# Patient Record
Sex: FEMALE | NOT HISPANIC OR LATINO | URBAN - METROPOLITAN AREA
[De-identification: names, ages, dates, MRNs, and addresses within clinical notes are randomized per-mention and may not be internally consistent; named-entity substitution may affect disease eponyms.]

---

## 2018-08-13 ENCOUNTER — EMERGENCY (EMERGENCY)
Facility: HOSPITAL | Age: 23
LOS: 1 days | Discharge: HOME | End: 2018-08-13
Attending: EMERGENCY MEDICINE
Payer: COMMERCIAL

## 2018-08-13 VITALS
HEART RATE: 83 BPM | DIASTOLIC BLOOD PRESSURE: 76 MMHG | RESPIRATION RATE: 18 BRPM | OXYGEN SATURATION: 98 % | TEMPERATURE: 98 F | SYSTOLIC BLOOD PRESSURE: 118 MMHG

## 2018-08-13 LAB
ALBUMIN SERPL ELPH-MCNC: 3.8 G/DL — SIGNIFICANT CHANGE UP (ref 3.5–5)
ALP SERPL-CCNC: 58 U/L — SIGNIFICANT CHANGE UP (ref 40–120)
ALT FLD-CCNC: 27 U/L DA — SIGNIFICANT CHANGE UP (ref 10–60)
ANION GAP SERPL CALC-SCNC: 6 MMOL/L — SIGNIFICANT CHANGE UP (ref 5–17)
APPEARANCE UR: CLEAR — SIGNIFICANT CHANGE UP
AST SERPL-CCNC: 23 U/L — SIGNIFICANT CHANGE UP (ref 10–40)
BASOPHILS # BLD AUTO: 0.1 K/UL — SIGNIFICANT CHANGE UP (ref 0–0.2)
BASOPHILS NFR BLD AUTO: 1 % — SIGNIFICANT CHANGE UP (ref 0–2)
BILIRUB SERPL-MCNC: 0.5 MG/DL — SIGNIFICANT CHANGE UP (ref 0.2–1.2)
BILIRUB UR-MCNC: NEGATIVE — SIGNIFICANT CHANGE UP
BUN SERPL-MCNC: 11 MG/DL — SIGNIFICANT CHANGE UP (ref 7–18)
CALCIUM SERPL-MCNC: 9.3 MG/DL — SIGNIFICANT CHANGE UP (ref 8.4–10.5)
CHLORIDE SERPL-SCNC: 108 MMOL/L — SIGNIFICANT CHANGE UP (ref 96–108)
CO2 SERPL-SCNC: 26 MMOL/L — SIGNIFICANT CHANGE UP (ref 22–31)
COLOR SPEC: YELLOW — SIGNIFICANT CHANGE UP
CREAT SERPL-MCNC: 0.9 MG/DL — SIGNIFICANT CHANGE UP (ref 0.5–1.3)
DIFF PNL FLD: ABNORMAL
EOSINOPHIL # BLD AUTO: 0.1 K/UL — SIGNIFICANT CHANGE UP (ref 0–0.5)
EOSINOPHIL NFR BLD AUTO: 2.1 % — SIGNIFICANT CHANGE UP (ref 0–6)
GLUCOSE SERPL-MCNC: 80 MG/DL — SIGNIFICANT CHANGE UP (ref 70–99)
GLUCOSE UR QL: NEGATIVE — SIGNIFICANT CHANGE UP
HCG UR QL: NEGATIVE — SIGNIFICANT CHANGE UP
HCT VFR BLD CALC: 41.6 % — SIGNIFICANT CHANGE UP (ref 34.5–45)
HGB BLD-MCNC: 13.2 G/DL — SIGNIFICANT CHANGE UP (ref 11.5–15.5)
KETONES UR-MCNC: NEGATIVE — SIGNIFICANT CHANGE UP
LEUKOCYTE ESTERASE UR-ACNC: NEGATIVE — SIGNIFICANT CHANGE UP
LYMPHOCYTES # BLD AUTO: 2.1 K/UL — SIGNIFICANT CHANGE UP (ref 1–3.3)
LYMPHOCYTES # BLD AUTO: 36.5 % — SIGNIFICANT CHANGE UP (ref 13–44)
MCHC RBC-ENTMCNC: 30.1 PG — SIGNIFICANT CHANGE UP (ref 27–34)
MCHC RBC-ENTMCNC: 31.7 GM/DL — LOW (ref 32–36)
MCV RBC AUTO: 95 FL — SIGNIFICANT CHANGE UP (ref 80–100)
MONOCYTES # BLD AUTO: 0.4 K/UL — SIGNIFICANT CHANGE UP (ref 0–0.9)
MONOCYTES NFR BLD AUTO: 7.1 % — SIGNIFICANT CHANGE UP (ref 2–14)
NEUTROPHILS # BLD AUTO: 3 K/UL — SIGNIFICANT CHANGE UP (ref 1.8–7.4)
NEUTROPHILS NFR BLD AUTO: 53.3 % — SIGNIFICANT CHANGE UP (ref 43–77)
NITRITE UR-MCNC: NEGATIVE — SIGNIFICANT CHANGE UP
PH UR: 5 — SIGNIFICANT CHANGE UP (ref 5–8)
PLATELET # BLD AUTO: 307 K/UL — SIGNIFICANT CHANGE UP (ref 150–400)
POTASSIUM SERPL-MCNC: 4.9 MMOL/L — SIGNIFICANT CHANGE UP (ref 3.5–5.3)
POTASSIUM SERPL-SCNC: 4.9 MMOL/L — SIGNIFICANT CHANGE UP (ref 3.5–5.3)
PROT SERPL-MCNC: 8.3 G/DL — SIGNIFICANT CHANGE UP (ref 6–8.3)
PROT UR-MCNC: 30 MG/DL
RBC # BLD: 4.38 M/UL — SIGNIFICANT CHANGE UP (ref 3.8–5.2)
RBC # FLD: 11.3 % — SIGNIFICANT CHANGE UP (ref 10.3–14.5)
SODIUM SERPL-SCNC: 140 MMOL/L — SIGNIFICANT CHANGE UP (ref 135–145)
SP GR SPEC: 1.02 — SIGNIFICANT CHANGE UP (ref 1.01–1.02)
UROBILINOGEN FLD QL: NEGATIVE — SIGNIFICANT CHANGE UP
WBC # BLD: 5.6 K/UL — SIGNIFICANT CHANGE UP (ref 3.8–10.5)
WBC # FLD AUTO: 5.6 K/UL — SIGNIFICANT CHANGE UP (ref 3.8–10.5)

## 2018-08-13 PROCEDURE — 76856 US EXAM PELVIC COMPLETE: CPT

## 2018-08-13 PROCEDURE — 76856 US EXAM PELVIC COMPLETE: CPT | Mod: 26

## 2018-08-13 PROCEDURE — 81001 URINALYSIS AUTO W/SCOPE: CPT

## 2018-08-13 PROCEDURE — 80053 COMPREHEN METABOLIC PANEL: CPT

## 2018-08-13 PROCEDURE — 76830 TRANSVAGINAL US NON-OB: CPT

## 2018-08-13 PROCEDURE — 99284 EMERGENCY DEPT VISIT MOD MDM: CPT

## 2018-08-13 PROCEDURE — 99284 EMERGENCY DEPT VISIT MOD MDM: CPT | Mod: 25

## 2018-08-13 PROCEDURE — 81025 URINE PREGNANCY TEST: CPT

## 2018-08-13 PROCEDURE — 85027 COMPLETE CBC AUTOMATED: CPT

## 2018-08-13 PROCEDURE — 87086 URINE CULTURE/COLONY COUNT: CPT

## 2018-08-13 PROCEDURE — 76830 TRANSVAGINAL US NON-OB: CPT | Mod: 26

## 2018-08-13 RX ORDER — SODIUM CHLORIDE 9 MG/ML
1000 INJECTION INTRAMUSCULAR; INTRAVENOUS; SUBCUTANEOUS ONCE
Qty: 0 | Refills: 0 | Status: COMPLETED | OUTPATIENT
Start: 2018-08-13 | End: 2018-08-13

## 2018-08-13 RX ADMIN — SODIUM CHLORIDE 1000 MILLILITER(S): 9 INJECTION INTRAMUSCULAR; INTRAVENOUS; SUBCUTANEOUS at 20:18

## 2018-08-13 NOTE — ED PROVIDER NOTE - OBJECTIVE STATEMENT
22 y/o F with no significant PMHx or no significant PSHx presents to the ED c/o severe vaginal bleeding x 2 weeks and worsening abd pain mild dizziness and nausea. Pt denies painful urination, vomiting, fever, chills or any other complaint. Pt stated that she has changed her birth control 3 months ago, has regular periods. SHx: no tobacco, drug or ETOH use. NKDA. 22 y/o F with no significant PMHx or no significant PSHx presents to the ED c/o severe vaginal bleeding x 2 weeks and worsening abd pain mild dizziness and nausea. Pt denies painful urination, vomiting, fever, chills or any other complaint. Pt stated that she has changed her birth control (Microgestin) 3 months ago, has regular periods. SHx: no tobacco, drug or ETOH use. NKDA.

## 2018-08-13 NOTE — ED PROVIDER NOTE - ATTENDING CONTRIBUTION TO CARE
I performed the initial face to face bedside interview with this patient regarding history of present illness, review of symptoms and past medical, social and family history.  I completed an independent physical examination.  I was the initial provider who evaluated this patient.  The history, review of symptoms and examination was documented by the scribe in my presence and I attest to the accuracy of the documentation.  I have signed out the follow up of any pending tests (i.e. labs, radiological studies) to the PA/NP.  I have discussed the patient’s plan of care and disposition with the PA/NP.

## 2018-08-13 NOTE — ED PROVIDER NOTE - PROGRESS NOTE DETAILS
NP NOTE: Pt seen by intake physician and HPI/ROS/PE/MDM reviewed. Pt seen and evaluated. Discussed plan and any resulted studies at this time. Will continue to monitor and re-evaluate.  Re-Evaluation: pt feeling improved. will rx naproxyn, provide gyn fu, and dc.

## 2018-08-13 NOTE — ED PROVIDER NOTE - MEDICAL DECISION MAKING DETAILS
13-Jun-2017 00:00
24 y/o F presenting to ED with Menorrhagia. Will do blood work, US. Pregnancy test: negative. Urine test: micro traces if blood.

## 2018-08-13 NOTE — ED ADULT NURSE REASSESSMENT NOTE - NS ED NURSE REASSESS COMMENT FT1
2113 - pt  feeling much better after the treat ment  IV dc'd with NS 1 liter completed . feels much better.

## 2018-08-13 NOTE — ED ADULT NURSE NOTE - NSIMPLEMENTINTERV_GEN_ALL_ED
Implemented All Universal Safety Interventions:  [place] to call system. Call bell, personal items and telephone within reach. Instruct patient to call for assistance. Room bathroom lighting operational. Non-slip footwear when patient is off stretcher. Physically safe environment: no spills, clutter or unnecessary equipment. Stretcher in lowest position, wheels locked, appropriate side rails in place.

## 2018-08-13 NOTE — ED ADULT NURSE NOTE - OBJECTIVE STATEMENT
pt came in with c/o vaginal bleeding x 3 weeks pt  denies any pain LMP last 7/22/2018 . pt  consumes 5 pads a day  as per pt. pt denies any dizziness/ nausea and vomiting

## 2018-08-14 LAB
CULTURE RESULTS: SIGNIFICANT CHANGE UP
SPECIMEN SOURCE: SIGNIFICANT CHANGE UP

## 2018-09-18 NOTE — ED ADULT TRIAGE NOTE - NS ED NURSE BANDS TYPE
Name band;
How Severe Is Your Psoriasis?: moderate
Is This A New Presentation, Or A Follow-Up?: Follow Up Psoriasis

## 2022-06-24 ENCOUNTER — EMERGENCY (EMERGENCY)
Facility: HOSPITAL | Age: 27
LOS: 1 days | Discharge: HOME | End: 2022-06-24
Attending: EMERGENCY MEDICINE
Payer: COMMERCIAL

## 2022-06-24 VITALS
HEIGHT: 64 IN | HEART RATE: 76 BPM | WEIGHT: 162.92 LBS | OXYGEN SATURATION: 99 % | RESPIRATION RATE: 18 BRPM | TEMPERATURE: 98 F | SYSTOLIC BLOOD PRESSURE: 119 MMHG | DIASTOLIC BLOOD PRESSURE: 79 MMHG

## 2022-06-24 PROCEDURE — 99282 EMERGENCY DEPT VISIT SF MDM: CPT

## 2022-06-24 RX ORDER — NEOMYCIN/POLYMYXIN B/HYDROCORT
2 SUSPENSION, DROPS(FINAL DOSAGE FORM)(ML) OTIC (EAR)
Qty: 1 | Refills: 0
Start: 2022-06-24 | End: 2022-06-30

## 2022-06-24 NOTE — ED PROVIDER NOTE - NSFOLLOWUPINSTRUCTIONS_ED_ALL_ED_FT
Otitis Externa    Ear anatomy showing the outer ear canal and the eardrum. The outer ear canal is red due to swimmer's ear.   Otitis externa is an infection of the outer ear canal. The outer ear canal is the area between the outside of the ear and the eardrum. Otitis externa is sometimes called swimmer's ear.      What are the causes?    Common causes of this condition include:  •Swimming in dirty water.      •Moisture in the ear.      •An injury to the inside of the ear.      •An object stuck in the ear.      •A cut or scrape on the outside of the ear or in the ear canal.        What increases the risk?    You are more likely to develop this condition if you go swimming often.      What are the signs or symptoms?    The first symptom of this condition is often itching in the ear. Later symptoms of the condition include:  •Swelling of the ear.      •Redness in the ear.      •Ear pain. The pain may get worse when you pull on your ear.      •Pus coming from the ear.        How is this diagnosed?    This condition may be diagnosed by examining the ear and testing fluid from the ear for bacteria and funguses.      How is this treated?    This condition may be treated with:  •Antibiotic ear drops. These are often given for 10–14 days.      •Medicines to reduce itching and swelling.        Follow these instructions at home:    •If you were prescribed antibiotic ear drops, use them as told by your health care provider. Do not stop using the antibiotic even if you start to feel better.      •Take over-the-counter and prescription medicines only as told by your health care provider.      •Avoid getting water in your ears as told by your health care provider. This may include avoiding swimming or water sports for a few days.      •Keep all follow-up visits. This is important.        How is this prevented?    •Keep your ears dry. Use the corner of a towel to dry your ears after you swim or bathe.      •Avoid scratching or putting things in your ear. Doing these things can damage the ear canal or remove the protective wax that lines it, which makes it easier for bacteria and funguses to grow.      •Avoid swimming in lakes, polluted water, or swimming pools that may not have enough chlorine.        Contact a health care provider if:    •You have a fever.      •Your ear is still red, swollen, painful, or draining pus after 3 days.      •Your redness, swelling, or pain gets worse.      •You have a severe headache.        Get help right away if:    •You have redness, swelling, and pain or tenderness in the area behind your ear.        Summary    •Otitis externa is an infection of the outer ear canal.      •Common causes include swimming in dirty water, moisture in the ear, or a cut or scrape in the ear.      •Symptoms include pain, redness, and swelling of the ear canal.      •If you were prescribed antibiotic ear drops, use them as told by your health care provider. Do not stop using the antibiotic even if you start to feel better.      This information is not intended to replace advice given to you by your health care provider. Make sure you discuss any questions you have with your health care provider.      Document Revised: 03/02/2022 Document Reviewed: 03/02/2022    Elsevier Patient Education © 2022 [employer]

## 2022-06-24 NOTE — ED PROVIDER NOTE - PATIENT PORTAL LINK FT
You can access the FollowMyHealth Patient Portal offered by [facility] by registering at the following website: [email or web address]. By joining [employer]’s FollowMyHealth portal, you will also be able to view your health information using other applications (apps) compatible with our system.

## 2022-06-24 NOTE — ED ADULT TRIAGE NOTE - ARRIVAL FROM
Additional Notes: Patient consent was obtained to proceed with the visit and recommended plan of care after discussion of all risks and benefits, including the risks of COVID-19 exposure. Detail Level: Simple Home

## 2022-06-24 NOTE — ED PROVIDER NOTE - OBJECTIVE STATEMENT
28 y/o female presents to ED with left ear pain since this morning.  pt reporting some ear discharge associated.  pt is unsure is something might be in her ear.  Denies fever, no cough, no sore throat.

## 2023-02-22 ENCOUNTER — EMERGENCY (EMERGENCY)
Facility: HOSPITAL | Age: 28
LOS: 1 days | Discharge: HOME | End: 2023-02-22
Admitting: EMERGENCY MEDICINE
Payer: COMMERCIAL

## 2023-02-22 VITALS
DIASTOLIC BLOOD PRESSURE: 89 MMHG | HEIGHT: 67 IN | RESPIRATION RATE: 20 BRPM | HEART RATE: 94 BPM | WEIGHT: 179.9 LBS | SYSTOLIC BLOOD PRESSURE: 129 MMHG | TEMPERATURE: 97 F | OXYGEN SATURATION: 98 %

## 2023-02-22 PROCEDURE — 99053 MED SERV 10PM-8AM 24 HR FAC: CPT

## 2023-02-22 PROCEDURE — 99284 EMERGENCY DEPT VISIT MOD MDM: CPT

## 2023-02-22 RX ORDER — LORATADINE 10 MG/1
10 TABLET ORAL ONCE
Refills: 0 | Status: COMPLETED | OUTPATIENT
Start: 2023-02-22 | End: 2023-02-22

## 2023-02-22 RX ADMIN — LORATADINE 10 MILLIGRAM(S): 10 TABLET ORAL at 23:56

## 2023-02-22 RX ADMIN — Medication 40 MILLIGRAM(S): at 23:57

## 2023-02-22 NOTE — ED ADULT TRIAGE NOTE - CHIEF COMPLAINT QUOTE
Pt wheeled into ER by security accompanied by friend. Pt admits to drinking tonight with friends and is unable to walk. Pt had x1 episode of vomiting PTA. Mother on telephone provided PMH (none) and allergy (strawberries). No obvious injuries observed.

## 2023-02-22 NOTE — ED PROVIDER NOTE - PHYSICAL EXAMINATION
Gen - WDWN F, +AOB, no acute distress  Skin - warm, dry, intact  HEENT - AT/NC, PERRL, mild conjunctival injection, pupils 3mm b/l, TM intact with no hemotympanium b/l, no facial contusion or periorbital ecchymosis, o/p clear with no erythema or edema, uvula midline, airway patent, neck supple with no step off or midline tenderness, FROM   CV - S1S2, R/R/R  Resp - respiration non-labored, CTAB, symmetric bs b/l, no r/r/w  GI - NABS, soft, ND, NT, no rebound or guarding, no CVAT b/l  MS - w/w/p, no c/c/e, calves supple and NT  Neuro - Alert and awake and oriented, slightly slurred speech, ambulatory with steady gait, no focal deficits

## 2023-02-22 NOTE — ED PROVIDER NOTE - CLINICAL SUMMARY MEDICAL DECISION MAKING FREE TEXT BOX
pt here for subjective sob/scratchy throat after accidental ingestion of alcoholic drink that contains strawberry, +known allergic reaction to strawberry without anaphylaxi, +public intox, euglycemic, give dose of antihistamine and steroids and monitored in the ED with improved mental status and sx, no airway involvement, tolerating po without N/V, AFVSS, currently ambulatory with steady gait, clear speech, without additional medical complaints noted.  Repeat exam and neuro/cranial nerve exams wnl.  No evidence of head/neck trauma. Pt feels much better and safe for discharge. Counseling provided. Medically stable for d/c. pt here for subjective sob/scratchy throat after accidental ingestion of alcoholic drink that contains strawberry, +known allergic reaction to strawberry without anaphylaxis, +public intox, euglycemic, give dose of antihistamine and steroids and monitored in the ED with improved mental status and sx, no airway involvement, tolerating po without N/V, AFVSS, currently ambulatory with steady gait, clear speech, without additional medical complaints noted.  Repeat exam and neuro/cranial nerve exams wnl.  No evidence of head/neck trauma. No relapse of allergic sx, friend at bedside, will accompany patient home. Pt feels much better and safe for discharge. Counseling provided. Medically stable for d/c.

## 2023-02-22 NOTE — ED PROVIDER NOTE - PATIENT PORTAL LINK FT
You can access the FollowMyHealth Patient Portal offered by [facility] by registering at the following website: [email or web address]. By joining [employer]’s FollowMyHealth portal, you will also be able to view your health information using other applications (apps) compatible with our system.

## 2023-02-22 NOTE — ED PROVIDER NOTE - OBJECTIVE STATEMENT
27 yo F with no known PMHx, BIBA for AMS and subjective sob after accidental ingestion of a sip of cocktail with strawberry. Pt has a known allergy to strawberry and accidentally had a sip of cocktail from her friends and noted feeling throat scratching sensation and subjective sob. Denies fever, chills, CP, palpitations, wheezing, stridors, change in phonating, tongue/lip swelling, focal weakness, HA, dizziness, N/V/D/C, oral/genital lesions, dysphagia, cough, paresthesia, numbness, tingling, malaise, and diaphoresis.  No other new products/meds/food reported.

## 2023-02-22 NOTE — ED PROVIDER NOTE - CARE PROVIDER_API CALL
your PMD,   Phone: (   )    -  Fax: (   )    -  Follow Up Time:     Liliana Jiménez (MD)  Allergy and Immunology  [address] [place] [address]  Phone: [phone]  Fax: [phone]  Follow Up Time:

## 2023-02-23 VITALS — OXYGEN SATURATION: 98 % | HEART RATE: 78 BPM | RESPIRATION RATE: 16 BRPM

## 2023-02-23 NOTE — ED ADULT NURSE NOTE - OBJECTIVE STATEMENT
Patient presents to the ED c/o AMS s/p alcohol intoxication and possible allergic reaction to a sip of her friend's drink containing strawberries. Patient is allergic to strawberries as per mom. Patient is currently resting in NAD on the pulse oximetry machine. Will continue to monitor in the ED.

## 2023-02-23 NOTE — ED ADULT NURSE REASSESSMENT NOTE - NS ED NURSE REASSESS COMMENT FT1
Transfer of care from ARMANDO Khoury. Pt sleeping in bed, in NAD, fall precautions maintained, will continue to monitor closely.

## 2023-02-24 DIAGNOSIS — R07.0 PAIN IN THROAT: ICD-10-CM
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
## 2023-02-24 DIAGNOSIS — F10.129 ALCOHOL ABUSE WITH INTOXICATION, UNSPECIFIED: ICD-10-CM

## 2023-02-24 DIAGNOSIS — R41.82 ALTERED MENTAL STATUS, UNSPECIFIED: ICD-10-CM

## 2023-02-24 DIAGNOSIS — T78.1XXA OTHER ADVERSE FOOD REACTIONS, NOT ELSEWHERE CLASSIFIED, INITIAL ENCOUNTER: ICD-10-CM

## 2023-02-24 DIAGNOSIS — R06.02 SHORTNESS OF BREATH: ICD-10-CM

## 2023-02-24 DIAGNOSIS — Z91.018 ALLERGY TO OTHER FOODS: ICD-10-CM
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
## 2024-04-17 NOTE — ED PROVIDER NOTE - CPE EDP EYES NORM
Based on CDI recommendations the Evaluating physical therapist has redefined and rewritten our focus of care as follows:    Physical Therapy SADIEMARY ALICE Hunter, 60 Day Summary:    FOCUS OF CARE FOR THE NEXT 60 DAYS: alcoholic cirrhosis, chronic alcohol use disorder, peripheral neuropathy, DMT2, anxiety and depression, insomnia, diarrhea based on documentation from Telehealth visit with Unique Brandt DO on 4/11/2024. Pt also has HTN, abnormal gait and recent history of fall with injury.    Physical Therapy treatment is needed to address Ms. Mireille Cordoba Debility and High fall risk related to alcoholic cirrhosis, chronic alcohol use disorder, peripheral neuropathy, DMT2, anxiety and depression, insomnia, diarrhea based on documentation from Telehealth visit with Unique Brandt DO on 4/11/2024. Pt also has HTN, abnormal gait and recent history of fall with injury. Pt recently moved to a new rental property which has laundry facilies in Basement and stairs at all entryways. Pt lives alons and has a paid caregiver who assists her with bathing and other ADLs. PT will be working on helping pt improve her safe ability to negotiate stairs. SN and OT have discharged patient.     Past Medical Hx:  Essential hypertension  Generalized anxiety disorder   Hyperlipidemia   Glaucoma    Alcoholic cirrhosis of liver without ascites   Severe to moderate malnutrition   GERD without esophagitis   Sleep disturbance   Idiopathic peripheral neuropathy   Severe anxiety with panic   Alcohol use disorder   Cigarette nicotine dependence in remission   Chronic obstructive pulmonary disease   Closed fracture of left hand     Current level of functional ability: Min assist to CGA for ambulation on stairs.  Homebound status and living arrangements: Lives alone. Paid CG assists 2 days per wk. Pt moved to this house several months ago and states she is hoping to move to a 1 level home ASA.    Skin integrity/wound status: CDI  Estimated date  when home care services will end 5/18/24  SDOH changes/barriers (i.e. Caregiver availability, social isolation, environment, income, transportation access, food insecurity etc.): none at this time  Need for MSW referral? N    Weight Bearing Status and Activity Precautions: FWB, fall precautions, ongoing ETOH use/abuse, DM and neuropathy effecting coordination and balance.  Code Status: CPR  Fall Risk: Moderate    PT Plan of care for next 30 days:  Planned PT Interventions: therapeutic activities, gait training, therapeutic exercise, neuromuscular re-education, pt/CG education (HEP, symptom management, fall risk reduction), assist with DME as needed for safe mobility.  PT Frequency and Duration: 1w4    Plan of Care Reviewed with: HH care team and Unique Brandt DO normal...

## 2024-07-10 PROBLEM — Z00.00 ENCOUNTER FOR PREVENTIVE HEALTH EXAMINATION: Status: ACTIVE | Noted: 2024-07-10

## 2024-07-24 ENCOUNTER — APPOINTMENT (OUTPATIENT)
Dept: NEUROLOGY | Facility: CLINIC | Age: 29
End: 2024-07-24
Payer: MEDICAID

## 2024-07-24 VITALS — SYSTOLIC BLOOD PRESSURE: 125 MMHG | HEART RATE: 79 BPM | DIASTOLIC BLOOD PRESSURE: 84 MMHG

## 2024-07-24 DIAGNOSIS — F32.A ANXIETY DISORDER, UNSPECIFIED: ICD-10-CM

## 2024-07-24 DIAGNOSIS — Z78.9 OTHER SPECIFIED HEALTH STATUS: ICD-10-CM

## 2024-07-24 DIAGNOSIS — E78.5 HYPERLIPIDEMIA, UNSPECIFIED: ICD-10-CM

## 2024-07-24 DIAGNOSIS — Z80.8 FAMILY HISTORY OF MALIGNANT NEOPLASM OF OTHER ORGANS OR SYSTEMS: ICD-10-CM

## 2024-07-24 DIAGNOSIS — Z83.3 FAMILY HISTORY OF DIABETES MELLITUS: ICD-10-CM

## 2024-07-24 DIAGNOSIS — G43.909 MIGRAINE, UNSPECIFIED, NOT INTRACTABLE, W/OUT STATUS MIGRAINOSUS: ICD-10-CM

## 2024-07-24 DIAGNOSIS — F41.9 ANXIETY DISORDER, UNSPECIFIED: ICD-10-CM

## 2024-07-24 PROCEDURE — 99203 OFFICE O/P NEW LOW 30 MIN: CPT

## 2024-07-24 NOTE — PHYSICAL EXAM
[General Appearance - Alert] : alert [General Appearance - Well Nourished] : well nourished [General Appearance - Well-Appearing] : healthy appearing [Oriented To Time, Place, And Person] : oriented to person, place, and time [Affect] : the affect was normal [Memory Recent] : recent memory was not impaired [Person] : oriented to person [Place] : oriented to place [Time] : oriented to time [Cranial Nerves Optic (II)] : visual acuity intact bilaterally,  visual fields full to confrontation, pupils equal round and reactive to light [Cranial Nerves Oculomotor (III)] : extraocular motion intact [Cranial Nerves Facial (VII)] : face symmetrical [Cranial Nerves Accessory (XI - Cranial And Spinal)] : head turning and shoulder shrug symmetric [Motor Tone] : muscle tone was normal in all four extremities [Motor Strength] : muscle strength was normal in all four extremities [Sensation Tactile Decrease] : light touch was intact [Balance] : balance was intact [2+] : Patella left 2+ [Sclera] : the sclera and conjunctiva were normal [Outer Ear] : the ears and nose were normal in appearance [Neck Appearance] : the appearance of the neck was normal [] : no respiratory distress [Abnormal Walk] : normal gait [Skin Color & Pigmentation] : normal skin color and pigmentation

## 2024-07-24 NOTE — ASSESSMENT
[FreeTextEntry1] : This is a case of a 29 yr right handed female with PMH of hld, migraines, anxiety and depression presents today with worsening migraines.  Will get MRI for familial hx reported by patient and worsening Ha Trial Naproxen   Plan:   {   \} Brain MRI-   {   \} naproxen 500 mg   {   \} start magnesium 400 and b2 400 mg   {   \} f/up 2-3 months   Headache education provided: [] Stay well hydrated [] Limit excessive caffeine and alcohol intake [] Maintain good sleep hygiene [] Try to avoid triggers [] Practice good eating habits [] Avoid excessive use of over the counter pain medications, as they can cause medication overuse headaches  [] Keep a headache diary [] Relaxation techniques, biofeedback, massage therapy, acupunctures, and heating pads may be effective   The above plan was discussed with Rubia Acevedo in great detail. Rubia Acevedo  verbalized understanding and agrees with plan as detailed above. Patient was provided education and counselling on current diagnosis/symptoms. She was advised to call our clinic at [phone] for any new or worsening symptoms, or with any questions or concerns. In case of acute onset of neurological symptoms or worsening presentation, patient was advised to present to nearest emergency room for further evaluation. Rubia Acevedo  expressed understanding and all her questions/concerns were addressed.

## 2024-07-24 NOTE — HISTORY OF PRESENT ILLNESS
[FreeTextEntry1] : This is a case of a 29 yr right handed female with PMH of hld, migraines, anxiety and depression presents today with headaches.  Pt has had a history of headaches since childhood and teens.  Pt reports headaches have been on and off through the years.  Headaches can be nonexistent for 5 months and come out of now where. In the end of June headaches have been more frequent.  headaches are now every 2 days.  Described as pressure.  denies n/v.  Pt does photophobia denies phonophobia.  Denies visual loss, doubled.  Bad headaches can cause blurriness.  Denies extremity weakness or slurred speech.  Treating headaches with walks which help.  Pt also taking vit c which helps.  Has not taken any OTC medication.  Also will do cold pack on head.  Triggers: stress, not sleeping well last couple of months,  her high cholesterol.  Pt is also moving,  moms health is not good.     Occupation: work- therapist  sleep: 7 hrs Allergies: NKA

## 2024-07-24 NOTE — REVIEW OF SYSTEMS
[Negative] : Neurological [As Noted in HPI] : as noted in HPI [Anxiety] : anxiety [Depression] : depression

## 2024-08-01 ENCOUNTER — APPOINTMENT (OUTPATIENT)
Dept: NEUROLOGY | Facility: CLINIC | Age: 29
End: 2024-08-01

## 2024-10-24 ENCOUNTER — APPOINTMENT (OUTPATIENT)
Dept: NEUROLOGY | Facility: CLINIC | Age: 29
End: 2024-10-24
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
